# Patient Record
Sex: FEMALE | Race: WHITE | NOT HISPANIC OR LATINO | Employment: FULL TIME | URBAN - METROPOLITAN AREA
[De-identification: names, ages, dates, MRNs, and addresses within clinical notes are randomized per-mention and may not be internally consistent; named-entity substitution may affect disease eponyms.]

---

## 2024-01-23 ENCOUNTER — TELEPHONE (OUTPATIENT)
Dept: OBSTETRICS AND GYNECOLOGY | Facility: CLINIC | Age: 24
End: 2024-01-23
Payer: COMMERCIAL

## 2024-01-23 NOTE — TELEPHONE ENCOUNTER
-      Pt is aware of her appt with Mrs Palmer Flannery        ---- Message from Gabriela Packer sent at 1/23/2024  1:03 PM CST -----  Type:  Needs Medical Advice    Who Called: Juan C Lyons,    Symptoms (please be specific): -   How long has patient had these symptoms:  -  Pharmacy name and phone #:  -  Would the patient rather a call back or a response via [facility]?    Best Call Back Number: [phone]\      Additional Information: pt would like to est care w/ NP Claribel sometime in April of this year ( system would not let me schedule ( system blocks ) please call pt to schedule appt